# Patient Record
Sex: MALE | Race: WHITE | Employment: OTHER | URBAN - METROPOLITAN AREA
[De-identification: names, ages, dates, MRNs, and addresses within clinical notes are randomized per-mention and may not be internally consistent; named-entity substitution may affect disease eponyms.]

---

## 2019-03-28 ASSESSMENT — MIFFLIN-ST. JEOR: SCORE: 1409.6

## 2019-04-24 RX ORDER — SIMVASTATIN 20 MG
20 TABLET ORAL AT BEDTIME
COMMUNITY

## 2019-04-24 RX ORDER — CALCIUM CARBONATE/VITAMIN D3 500-10/5ML
400 LIQUID (ML) ORAL DAILY
COMMUNITY

## 2019-04-24 RX ORDER — LORATADINE 10 MG/1
10 TABLET ORAL DAILY
COMMUNITY

## 2019-04-24 RX ORDER — LISINOPRIL AND HYDROCHLOROTHIAZIDE 20; 25 MG/1; MG/1
1 TABLET ORAL DAILY
COMMUNITY

## 2019-04-24 RX ORDER — ALBUTEROL SULFATE 90 UG/1
1-2 AEROSOL, METERED RESPIRATORY (INHALATION) EVERY 4 HOURS PRN
COMMUNITY

## 2019-04-25 ENCOUNTER — HOSPITAL ENCOUNTER (INPATIENT)
Facility: CLINIC | Age: 59
LOS: 1 days | Discharge: HOME | DRG: 460 | End: 2019-04-26
Attending: ORTHOPAEDIC SURGERY | Admitting: ORTHOPAEDIC SURGERY
Payer: OTHER MISCELLANEOUS

## 2019-04-25 ENCOUNTER — ANESTHESIA EVENT (OUTPATIENT)
Dept: SURGERY | Facility: CLINIC | Age: 59
DRG: 460 | End: 2019-04-25
Payer: OTHER MISCELLANEOUS

## 2019-04-25 ENCOUNTER — APPOINTMENT (OUTPATIENT)
Dept: GENERAL RADIOLOGY | Facility: CLINIC | Age: 59
DRG: 460 | End: 2019-04-25
Attending: ORTHOPAEDIC SURGERY
Payer: OTHER MISCELLANEOUS

## 2019-04-25 ENCOUNTER — ANESTHESIA (OUTPATIENT)
Dept: SURGERY | Facility: CLINIC | Age: 59
DRG: 460 | End: 2019-04-25
Payer: OTHER MISCELLANEOUS

## 2019-04-25 DIAGNOSIS — M47.816 LUMBAR SPONDYLOSIS: Primary | ICD-10-CM

## 2019-04-25 LAB
ABO + RH BLD: NORMAL
ABO + RH BLD: NORMAL
BLD GP AB SCN SERPL QL: NORMAL
BLOOD BANK CMNT PATIENT-IMP: NORMAL
SPECIMEN EXP DATE BLD: NORMAL

## 2019-04-25 PROCEDURE — 25800030 ZZH RX IP 258 OP 636: Performed by: PHYSICIAN ASSISTANT

## 2019-04-25 PROCEDURE — 25800030 ZZH RX IP 258 OP 636: Performed by: ORTHOPAEDIC SURGERY

## 2019-04-25 PROCEDURE — C1762 CONN TISS, HUMAN(INC FASCIA): HCPCS | Performed by: ORTHOPAEDIC SURGERY

## 2019-04-25 PROCEDURE — 0SB40ZZ EXCISION OF LUMBOSACRAL DISC, OPEN APPROACH: ICD-10-PCS | Performed by: ORTHOPAEDIC SURGERY

## 2019-04-25 PROCEDURE — 0SG30A0 FUSION OF LUMBOSACRAL JOINT WITH INTERBODY FUSION DEVICE, ANTERIOR APPROACH, ANTERIOR COLUMN, OPEN APPROACH: ICD-10-PCS | Performed by: ORTHOPAEDIC SURGERY

## 2019-04-25 PROCEDURE — 27210995 ZZH RX 272: Performed by: ORTHOPAEDIC SURGERY

## 2019-04-25 PROCEDURE — 25000125 ZZHC RX 250: Performed by: NURSE ANESTHETIST, CERTIFIED REGISTERED

## 2019-04-25 PROCEDURE — 99207 ZZC CONSULT E&M CHANGED TO INITIAL LEVEL: CPT | Performed by: INTERNAL MEDICINE

## 2019-04-25 PROCEDURE — 25000125 ZZHC RX 250: Performed by: ORTHOPAEDIC SURGERY

## 2019-04-25 PROCEDURE — 71000012 ZZH RECOVERY PHASE 1 LEVEL 1 FIRST HR: Performed by: ORTHOPAEDIC SURGERY

## 2019-04-25 PROCEDURE — 86901 BLOOD TYPING SEROLOGIC RH(D): CPT | Performed by: ANESTHESIOLOGY

## 2019-04-25 PROCEDURE — 71000013 ZZH RECOVERY PHASE 1 LEVEL 1 EA ADDTL HR: Performed by: ORTHOPAEDIC SURGERY

## 2019-04-25 PROCEDURE — 27210794 ZZH OR GENERAL SUPPLY STERILE: Performed by: ORTHOPAEDIC SURGERY

## 2019-04-25 PROCEDURE — 25000128 H RX IP 250 OP 636: Performed by: NURSE ANESTHETIST, CERTIFIED REGISTERED

## 2019-04-25 PROCEDURE — 37000009 ZZH ANESTHESIA TECHNICAL FEE, EACH ADDTL 15 MIN: Performed by: ORTHOPAEDIC SURGERY

## 2019-04-25 PROCEDURE — 40000277 XR SURGERY CARM FLUORO LESS THAN 5 MIN W STILLS: Mod: TC

## 2019-04-25 PROCEDURE — 12000000 ZZH R&B MED SURG/OB

## 2019-04-25 PROCEDURE — 86850 RBC ANTIBODY SCREEN: CPT | Performed by: ANESTHESIOLOGY

## 2019-04-25 PROCEDURE — 25000128 H RX IP 250 OP 636: Performed by: ANESTHESIOLOGY

## 2019-04-25 PROCEDURE — 40000306 ZZH STATISTIC PRE PROC ASSESS II: Performed by: ORTHOPAEDIC SURGERY

## 2019-04-25 PROCEDURE — C1713 ANCHOR/SCREW BN/BN,TIS/BN: HCPCS | Performed by: ORTHOPAEDIC SURGERY

## 2019-04-25 PROCEDURE — 25800030 ZZH RX IP 258 OP 636: Performed by: NURSE ANESTHETIST, CERTIFIED REGISTERED

## 2019-04-25 PROCEDURE — 99222 1ST HOSP IP/OBS MODERATE 55: CPT | Performed by: INTERNAL MEDICINE

## 2019-04-25 PROCEDURE — 25000128 H RX IP 250 OP 636: Performed by: PHYSICIAN ASSISTANT

## 2019-04-25 PROCEDURE — 36000071 ZZH SURGERY LEVEL 5 W FLUORO 1ST 30 MIN: Performed by: ORTHOPAEDIC SURGERY

## 2019-04-25 PROCEDURE — 01NB0ZZ RELEASE LUMBAR NERVE, OPEN APPROACH: ICD-10-PCS | Performed by: ORTHOPAEDIC SURGERY

## 2019-04-25 PROCEDURE — 25800030 ZZH RX IP 258 OP 636: Performed by: ANESTHESIOLOGY

## 2019-04-25 PROCEDURE — 37000008 ZZH ANESTHESIA TECHNICAL FEE, 1ST 30 MIN: Performed by: ORTHOPAEDIC SURGERY

## 2019-04-25 PROCEDURE — 86900 BLOOD TYPING SEROLOGIC ABO: CPT | Performed by: ANESTHESIOLOGY

## 2019-04-25 PROCEDURE — 36000069 ZZH SURGERY LEVEL 5 EA 15 ADDTL MIN: Performed by: ORTHOPAEDIC SURGERY

## 2019-04-25 PROCEDURE — 25000132 ZZH RX MED GY IP 250 OP 250 PS 637: Performed by: PHYSICIAN ASSISTANT

## 2019-04-25 PROCEDURE — 36415 COLL VENOUS BLD VENIPUNCTURE: CPT | Performed by: ANESTHESIOLOGY

## 2019-04-25 DEVICE — GRAFT BONE CRUSH CANC 15ML 400075: Type: IMPLANTABLE DEVICE | Site: SPINE LUMBAR | Status: FUNCTIONAL

## 2019-04-25 DEVICE — IMPLANTABLE DEVICE: Type: IMPLANTABLE DEVICE | Site: SPINE LUMBAR | Status: FUNCTIONAL

## 2019-04-25 RX ORDER — OXYCODONE HYDROCHLORIDE 5 MG/1
5-10 TABLET ORAL
Status: DISCONTINUED | OUTPATIENT
Start: 2019-04-25 | End: 2019-04-26 | Stop reason: HOSPADM

## 2019-04-25 RX ORDER — SODIUM CHLORIDE, SODIUM LACTATE, POTASSIUM CHLORIDE, CALCIUM CHLORIDE 600; 310; 30; 20 MG/100ML; MG/100ML; MG/100ML; MG/100ML
INJECTION, SOLUTION INTRAVENOUS CONTINUOUS
Status: DISCONTINUED | OUTPATIENT
Start: 2019-04-25 | End: 2019-04-25 | Stop reason: HOSPADM

## 2019-04-25 RX ORDER — MEPERIDINE HYDROCHLORIDE 25 MG/ML
12.5 INJECTION INTRAMUSCULAR; INTRAVENOUS; SUBCUTANEOUS
Status: DISCONTINUED | OUTPATIENT
Start: 2019-04-25 | End: 2019-04-25 | Stop reason: HOSPADM

## 2019-04-25 RX ORDER — MAGNESIUM OXIDE 400 MG/1
400 TABLET ORAL DAILY
Status: DISCONTINUED | OUTPATIENT
Start: 2019-04-25 | End: 2019-04-26 | Stop reason: HOSPADM

## 2019-04-25 RX ORDER — HYDRALAZINE HYDROCHLORIDE 20 MG/ML
2.5-5 INJECTION INTRAMUSCULAR; INTRAVENOUS EVERY 10 MIN PRN
Status: DISCONTINUED | OUTPATIENT
Start: 2019-04-25 | End: 2019-04-25 | Stop reason: HOSPADM

## 2019-04-25 RX ORDER — CEFAZOLIN SODIUM 2 G/100ML
2 INJECTION, SOLUTION INTRAVENOUS
Status: COMPLETED | OUTPATIENT
Start: 2019-04-25 | End: 2019-04-25

## 2019-04-25 RX ORDER — HYDROMORPHONE HYDROCHLORIDE 1 MG/ML
.3-.5 INJECTION, SOLUTION INTRAMUSCULAR; INTRAVENOUS; SUBCUTANEOUS
Status: DISCONTINUED | OUTPATIENT
Start: 2019-04-25 | End: 2019-04-26 | Stop reason: HOSPADM

## 2019-04-25 RX ORDER — GLYCOPYRROLATE 0.2 MG/ML
INJECTION, SOLUTION INTRAMUSCULAR; INTRAVENOUS PRN
Status: DISCONTINUED | OUTPATIENT
Start: 2019-04-25 | End: 2019-04-25

## 2019-04-25 RX ORDER — ONDANSETRON 2 MG/ML
INJECTION INTRAMUSCULAR; INTRAVENOUS PRN
Status: DISCONTINUED | OUTPATIENT
Start: 2019-04-25 | End: 2019-04-25

## 2019-04-25 RX ORDER — LIDOCAINE 40 MG/G
CREAM TOPICAL
Status: DISCONTINUED | OUTPATIENT
Start: 2019-04-25 | End: 2019-04-25 | Stop reason: HOSPADM

## 2019-04-25 RX ORDER — LISINOPRIL AND HYDROCHLOROTHIAZIDE 20; 25 MG/1; MG/1
1 TABLET ORAL DAILY
Status: DISCONTINUED | OUTPATIENT
Start: 2019-04-25 | End: 2019-04-26 | Stop reason: HOSPADM

## 2019-04-25 RX ORDER — ONDANSETRON 2 MG/ML
4 INJECTION INTRAMUSCULAR; INTRAVENOUS EVERY 30 MIN PRN
Status: DISCONTINUED | OUTPATIENT
Start: 2019-04-25 | End: 2019-04-25 | Stop reason: HOSPADM

## 2019-04-25 RX ORDER — PANTOPRAZOLE SODIUM 40 MG/1
40 TABLET, DELAYED RELEASE ORAL
Status: DISCONTINUED | OUTPATIENT
Start: 2019-04-26 | End: 2019-04-26 | Stop reason: HOSPADM

## 2019-04-25 RX ORDER — NALOXONE HYDROCHLORIDE 0.4 MG/ML
.1-.4 INJECTION, SOLUTION INTRAMUSCULAR; INTRAVENOUS; SUBCUTANEOUS
Status: DISCONTINUED | OUTPATIENT
Start: 2019-04-25 | End: 2019-04-25 | Stop reason: HOSPADM

## 2019-04-25 RX ORDER — PROPOFOL 10 MG/ML
INJECTION, EMULSION INTRAVENOUS PRN
Status: DISCONTINUED | OUTPATIENT
Start: 2019-04-25 | End: 2019-04-25

## 2019-04-25 RX ORDER — PROPOFOL 10 MG/ML
INJECTION, EMULSION INTRAVENOUS CONTINUOUS PRN
Status: DISCONTINUED | OUTPATIENT
Start: 2019-04-25 | End: 2019-04-25

## 2019-04-25 RX ORDER — LIDOCAINE 40 MG/G
CREAM TOPICAL
Status: DISCONTINUED | OUTPATIENT
Start: 2019-04-25 | End: 2019-04-26 | Stop reason: HOSPADM

## 2019-04-25 RX ORDER — AMOXICILLIN 250 MG
1 CAPSULE ORAL 2 TIMES DAILY
Status: DISCONTINUED | OUTPATIENT
Start: 2019-04-25 | End: 2019-04-26 | Stop reason: HOSPADM

## 2019-04-25 RX ORDER — DEXAMETHASONE SODIUM PHOSPHATE 4 MG/ML
4 INJECTION, SOLUTION INTRA-ARTICULAR; INTRALESIONAL; INTRAMUSCULAR; INTRAVENOUS; SOFT TISSUE EVERY 10 MIN PRN
Status: DISCONTINUED | OUTPATIENT
Start: 2019-04-25 | End: 2019-04-25 | Stop reason: HOSPADM

## 2019-04-25 RX ORDER — ACETAMINOPHEN 325 MG/1
650 TABLET ORAL EVERY 4 HOURS PRN
Status: DISCONTINUED | OUTPATIENT
Start: 2019-04-28 | End: 2019-04-26 | Stop reason: HOSPADM

## 2019-04-25 RX ORDER — METOCLOPRAMIDE HYDROCHLORIDE 5 MG/ML
10 INJECTION INTRAMUSCULAR; INTRAVENOUS EVERY 6 HOURS PRN
Status: DISCONTINUED | OUTPATIENT
Start: 2019-04-25 | End: 2019-04-25 | Stop reason: HOSPADM

## 2019-04-25 RX ORDER — METOCLOPRAMIDE 10 MG/1
10 TABLET ORAL EVERY 6 HOURS PRN
Status: DISCONTINUED | OUTPATIENT
Start: 2019-04-25 | End: 2019-04-25 | Stop reason: HOSPADM

## 2019-04-25 RX ORDER — FENTANYL CITRATE 50 UG/ML
INJECTION, SOLUTION INTRAMUSCULAR; INTRAVENOUS PRN
Status: DISCONTINUED | OUTPATIENT
Start: 2019-04-25 | End: 2019-04-25

## 2019-04-25 RX ORDER — SIMVASTATIN 20 MG
20 TABLET ORAL AT BEDTIME
Status: DISCONTINUED | OUTPATIENT
Start: 2019-04-25 | End: 2019-04-26 | Stop reason: HOSPADM

## 2019-04-25 RX ORDER — KETOROLAC TROMETHAMINE 30 MG/ML
30 INJECTION, SOLUTION INTRAMUSCULAR; INTRAVENOUS EVERY 6 HOURS PRN
Status: DISCONTINUED | OUTPATIENT
Start: 2019-04-25 | End: 2019-04-25 | Stop reason: HOSPADM

## 2019-04-25 RX ORDER — NALOXONE HYDROCHLORIDE 0.4 MG/ML
.1-.4 INJECTION, SOLUTION INTRAMUSCULAR; INTRAVENOUS; SUBCUTANEOUS
Status: DISCONTINUED | OUTPATIENT
Start: 2019-04-25 | End: 2019-04-26 | Stop reason: HOSPADM

## 2019-04-25 RX ORDER — LORATADINE 10 MG/1
10 TABLET ORAL DAILY
Status: DISCONTINUED | OUTPATIENT
Start: 2019-04-26 | End: 2019-04-26 | Stop reason: HOSPADM

## 2019-04-25 RX ORDER — CEFAZOLIN SODIUM 1 G/3ML
1 INJECTION, POWDER, FOR SOLUTION INTRAMUSCULAR; INTRAVENOUS SEE ADMIN INSTRUCTIONS
Status: DISCONTINUED | OUTPATIENT
Start: 2019-04-25 | End: 2019-04-25 | Stop reason: HOSPADM

## 2019-04-25 RX ORDER — LIDOCAINE HYDROCHLORIDE 10 MG/ML
INJECTION, SOLUTION INFILTRATION; PERINEURAL PRN
Status: DISCONTINUED | OUTPATIENT
Start: 2019-04-25 | End: 2019-04-25

## 2019-04-25 RX ORDER — FENTANYL CITRATE 50 UG/ML
25-50 INJECTION, SOLUTION INTRAMUSCULAR; INTRAVENOUS
Status: DISCONTINUED | OUTPATIENT
Start: 2019-04-25 | End: 2019-04-25 | Stop reason: HOSPADM

## 2019-04-25 RX ORDER — LABETALOL 20 MG/4 ML (5 MG/ML) INTRAVENOUS SYRINGE
10
Status: COMPLETED | OUTPATIENT
Start: 2019-04-25 | End: 2019-04-25

## 2019-04-25 RX ORDER — ALBUTEROL SULFATE 90 UG/1
1-2 AEROSOL, METERED RESPIRATORY (INHALATION) EVERY 4 HOURS PRN
Status: DISCONTINUED | OUTPATIENT
Start: 2019-04-25 | End: 2019-04-26 | Stop reason: HOSPADM

## 2019-04-25 RX ORDER — ONDANSETRON 4 MG/1
4 TABLET, ORALLY DISINTEGRATING ORAL EVERY 6 HOURS PRN
Status: DISCONTINUED | OUTPATIENT
Start: 2019-04-25 | End: 2019-04-26 | Stop reason: HOSPADM

## 2019-04-25 RX ORDER — ONDANSETRON 2 MG/ML
4 INJECTION INTRAMUSCULAR; INTRAVENOUS EVERY 6 HOURS PRN
Status: DISCONTINUED | OUTPATIENT
Start: 2019-04-25 | End: 2019-04-26 | Stop reason: HOSPADM

## 2019-04-25 RX ORDER — ONDANSETRON 4 MG/1
4 TABLET, ORALLY DISINTEGRATING ORAL EVERY 30 MIN PRN
Status: DISCONTINUED | OUTPATIENT
Start: 2019-04-25 | End: 2019-04-25 | Stop reason: HOSPADM

## 2019-04-25 RX ORDER — DIPHENHYDRAMINE HYDROCHLORIDE 50 MG/ML
25 INJECTION INTRAMUSCULAR; INTRAVENOUS EVERY 6 HOURS PRN
Status: DISCONTINUED | OUTPATIENT
Start: 2019-04-25 | End: 2019-04-26 | Stop reason: HOSPADM

## 2019-04-25 RX ORDER — AMOXICILLIN 250 MG
2 CAPSULE ORAL 2 TIMES DAILY
Status: DISCONTINUED | OUTPATIENT
Start: 2019-04-25 | End: 2019-04-26 | Stop reason: HOSPADM

## 2019-04-25 RX ORDER — DIAZEPAM 5 MG
5 TABLET ORAL EVERY 6 HOURS PRN
Status: DISCONTINUED | OUTPATIENT
Start: 2019-04-25 | End: 2019-04-26 | Stop reason: HOSPADM

## 2019-04-25 RX ORDER — DEXAMETHASONE SODIUM PHOSPHATE 4 MG/ML
INJECTION, SOLUTION INTRA-ARTICULAR; INTRALESIONAL; INTRAMUSCULAR; INTRAVENOUS; SOFT TISSUE PRN
Status: DISCONTINUED | OUTPATIENT
Start: 2019-04-25 | End: 2019-04-25

## 2019-04-25 RX ORDER — DIMENHYDRINATE 50 MG/ML
25 INJECTION, SOLUTION INTRAMUSCULAR; INTRAVENOUS
Status: DISCONTINUED | OUTPATIENT
Start: 2019-04-25 | End: 2019-04-25 | Stop reason: HOSPADM

## 2019-04-25 RX ORDER — ACETAMINOPHEN 325 MG/1
975 TABLET ORAL EVERY 8 HOURS
Status: DISCONTINUED | OUTPATIENT
Start: 2019-04-25 | End: 2019-04-26 | Stop reason: HOSPADM

## 2019-04-25 RX ORDER — SODIUM CHLORIDE 9 MG/ML
INJECTION, SOLUTION INTRAVENOUS CONTINUOUS
Status: DISCONTINUED | OUTPATIENT
Start: 2019-04-25 | End: 2019-04-26 | Stop reason: HOSPADM

## 2019-04-25 RX ORDER — CEFAZOLIN SODIUM 1 G/50ML
1 INJECTION, SOLUTION INTRAVENOUS EVERY 8 HOURS
Status: COMPLETED | OUTPATIENT
Start: 2019-04-25 | End: 2019-04-26

## 2019-04-25 RX ORDER — AMLODIPINE BESYLATE 5 MG/1
5 TABLET ORAL DAILY
Status: DISCONTINUED | OUTPATIENT
Start: 2019-04-25 | End: 2019-04-26 | Stop reason: HOSPADM

## 2019-04-25 RX ORDER — ACETAMINOPHEN 325 MG/1
650 TABLET ORAL 4 TIMES DAILY PRN
Status: DISCONTINUED | OUTPATIENT
Start: 2019-04-25 | End: 2019-04-25 | Stop reason: ALTCHOICE

## 2019-04-25 RX ORDER — DIPHENHYDRAMINE HCL 25 MG
25 CAPSULE ORAL EVERY 6 HOURS PRN
Status: DISCONTINUED | OUTPATIENT
Start: 2019-04-25 | End: 2019-04-26 | Stop reason: HOSPADM

## 2019-04-25 RX ADMIN — PHENYLEPHRINE HYDROCHLORIDE 50 MCG: 10 INJECTION, SOLUTION INTRAMUSCULAR; INTRAVENOUS; SUBCUTANEOUS at 11:38

## 2019-04-25 RX ADMIN — MAGNESIUM OXIDE TAB 400 MG (241.3 MG ELEMENTAL MG) 400 MG: 400 (241.3 MG) TAB at 16:59

## 2019-04-25 RX ADMIN — OXYCODONE HYDROCHLORIDE 10 MG: 5 TABLET ORAL at 23:19

## 2019-04-25 RX ADMIN — CEFAZOLIN SODIUM 1 G: 1 INJECTION, SOLUTION INTRAVENOUS at 20:11

## 2019-04-25 RX ADMIN — LISINOPRIL AND HYDROCHLOROTHIAZIDE 1 TABLET: 25; 20 TABLET ORAL at 16:59

## 2019-04-25 RX ADMIN — Medication 0.5 MG: at 13:17

## 2019-04-25 RX ADMIN — FENTANYL CITRATE 50 MCG: 50 INJECTION, SOLUTION INTRAMUSCULAR; INTRAVENOUS at 13:08

## 2019-04-25 RX ADMIN — LIDOCAINE HYDROCHLORIDE 50 MG: 10 INJECTION, SOLUTION INFILTRATION; PERINEURAL at 10:59

## 2019-04-25 RX ADMIN — SODIUM CHLORIDE, POTASSIUM CHLORIDE, SODIUM LACTATE AND CALCIUM CHLORIDE: 600; 310; 30; 20 INJECTION, SOLUTION INTRAVENOUS at 10:15

## 2019-04-25 RX ADMIN — SODIUM CHLORIDE: 9 INJECTION, SOLUTION INTRAVENOUS at 20:14

## 2019-04-25 RX ADMIN — SODIUM CHLORIDE, POTASSIUM CHLORIDE, SODIUM LACTATE AND CALCIUM CHLORIDE: 600; 310; 30; 20 INJECTION, SOLUTION INTRAVENOUS at 11:00

## 2019-04-25 RX ADMIN — SENNOSIDES AND DOCUSATE SODIUM 1 TABLET: 8.6; 5 TABLET ORAL at 20:11

## 2019-04-25 RX ADMIN — MIDAZOLAM 2 MG: 1 INJECTION INTRAMUSCULAR; INTRAVENOUS at 10:56

## 2019-04-25 RX ADMIN — AMLODIPINE BESYLATE 5 MG: 5 TABLET ORAL at 16:59

## 2019-04-25 RX ADMIN — DEXAMETHASONE SODIUM PHOSPHATE 8 MG: 4 INJECTION, SOLUTION INTRA-ARTICULAR; INTRALESIONAL; INTRAMUSCULAR; INTRAVENOUS; SOFT TISSUE at 10:59

## 2019-04-25 RX ADMIN — GLYCOPYRROLATE 0.2 MG: 0.2 INJECTION, SOLUTION INTRAMUSCULAR; INTRAVENOUS at 10:59

## 2019-04-25 RX ADMIN — HYDROMORPHONE HYDROCHLORIDE 1 MG: 1 INJECTION, SOLUTION INTRAMUSCULAR; INTRAVENOUS; SUBCUTANEOUS at 11:15

## 2019-04-25 RX ADMIN — OXYCODONE HYDROCHLORIDE 10 MG: 5 TABLET ORAL at 20:11

## 2019-04-25 RX ADMIN — PHENYLEPHRINE HYDROCHLORIDE 50 MCG: 10 INJECTION, SOLUTION INTRAMUSCULAR; INTRAVENOUS; SUBCUTANEOUS at 11:50

## 2019-04-25 RX ADMIN — ACETAMINOPHEN 975 MG: 325 TABLET, FILM COATED ORAL at 23:19

## 2019-04-25 RX ADMIN — ROCURONIUM BROMIDE 50 MG: 10 INJECTION INTRAVENOUS at 10:59

## 2019-04-25 RX ADMIN — ACETAMINOPHEN 975 MG: 325 TABLET, FILM COATED ORAL at 16:59

## 2019-04-25 RX ADMIN — FENTANYL CITRATE 50 MCG: 50 INJECTION, SOLUTION INTRAMUSCULAR; INTRAVENOUS at 13:37

## 2019-04-25 RX ADMIN — LABETALOL 20 MG/4 ML (5 MG/ML) INTRAVENOUS SYRINGE 10 MG: at 13:33

## 2019-04-25 RX ADMIN — CEFAZOLIN SODIUM 2 G: 2 INJECTION, SOLUTION INTRAVENOUS at 11:05

## 2019-04-25 RX ADMIN — SIMVASTATIN 20 MG: 20 TABLET, FILM COATED ORAL at 23:19

## 2019-04-25 RX ADMIN — FENTANYL CITRATE 150 MCG: 50 INJECTION, SOLUTION INTRAMUSCULAR; INTRAVENOUS at 10:59

## 2019-04-25 RX ADMIN — PROPOFOL 200 MG: 10 INJECTION, EMULSION INTRAVENOUS at 10:59

## 2019-04-25 RX ADMIN — ONDANSETRON 4 MG: 2 INJECTION INTRAMUSCULAR; INTRAVENOUS at 12:10

## 2019-04-25 RX ADMIN — OXYCODONE HYDROCHLORIDE 5 MG: 5 TABLET ORAL at 16:59

## 2019-04-25 RX ADMIN — FENTANYL CITRATE 100 MCG: 50 INJECTION, SOLUTION INTRAMUSCULAR; INTRAVENOUS at 11:17

## 2019-04-25 RX ADMIN — PROPOFOL 100 MG: 10 INJECTION, EMULSION INTRAVENOUS at 12:22

## 2019-04-25 RX ADMIN — FENTANYL CITRATE 50 MCG: 50 INJECTION, SOLUTION INTRAMUSCULAR; INTRAVENOUS at 13:14

## 2019-04-25 RX ADMIN — PROPOFOL 50 MCG/KG/MIN: 10 INJECTION, EMULSION INTRAVENOUS at 10:59

## 2019-04-25 RX ADMIN — FENTANYL CITRATE 50 MCG: 50 INJECTION, SOLUTION INTRAMUSCULAR; INTRAVENOUS at 14:08

## 2019-04-25 RX ADMIN — PHENYLEPHRINE HYDROCHLORIDE 100 MCG: 10 INJECTION, SOLUTION INTRAMUSCULAR; INTRAVENOUS; SUBCUTANEOUS at 11:32

## 2019-04-25 ASSESSMENT — ACTIVITIES OF DAILY LIVING (ADL)
ADLS_ACUITY_SCORE: 13
ADLS_ACUITY_SCORE: 10

## 2019-04-25 ASSESSMENT — MIFFLIN-ST. JEOR: SCORE: 1405.06

## 2019-04-25 NOTE — ANESTHESIA POSTPROCEDURE EVALUATION
Patient: Damaso English    Procedure(s):  L5-S1 anterior lumbar interbody fusion    Diagnosis:Back pain  Diagnosis Additional Information: Diagnosis: Back pain    Anesthesia Type:  General, ETT    Note:  Anesthesia Post Evaluation    Patient location during evaluation: PACU  Patient participation: Able to fully participate in evaluation  Level of consciousness: awake and alert  Pain management: adequate  Airway patency: patent  Cardiovascular status: acceptable  Respiratory status: acceptable  Hydration status: acceptable  PONV: controlled     Anesthetic complications: None          Last vitals:  Vitals:    04/25/19 1445 04/25/19 1500 04/25/19 1553   BP:  157/83 154/79   Pulse:  70    Resp: 26 17 12   Temp:   98.6  F (37  C)   SpO2: 91% 97% 96%         Electronically Signed By: Moisés Ceron MD  April 25, 2019  4:25 PM

## 2019-04-25 NOTE — ANESTHESIA CARE TRANSFER NOTE
Patient: Damaso English    Procedure(s):  L5-S1 anterior lumbar interbody fusion    Diagnosis: Back pain  Diagnosis Additional Information: No value filed.    Anesthesia Type:   General, ETT     Note:  Airway :Face Mask  Patient transferred to:PACU  Comments:   4/4, moving all extremities, pt follows commands, suctioned orally, extubated without complications.Pt tx to PACU, VSS, pt comfortable. Report given to  PACU RN.Handoff Report: Identifed the Patient, Identified the Reponsible Provider, Reviewed the pertinent medical history, Discussed the surgical course, Reviewed Intra-OP anesthesia mangement and issues during anesthesia, Set expectations for post-procedure period and Allowed opportunity for questions and acknowledgement of understanding      Vitals: (Last set prior to Anesthesia Care Transfer)    CRNA VITALS  4/25/2019 1158 - 4/25/2019 1234      4/25/2019             NIBP:  163/85    Pulse:  95    NIBP Mean:  107    SpO2:  99 %    Resp Rate (observed):  13                Electronically Signed By: JOSE LUIS Armstrong CRNA  April 25, 2019  12:34 PM

## 2019-04-25 NOTE — PHARMACY-ADMISSION MEDICATION HISTORY
Admission medication history interview status for this patient is complete. See [facility] admission navigator for allergy information, prior to admission medications and immunization status.     PTA meds completed by pre-admitting nurse Xochitl Carbajal and reviewed by pharmacy       Prior to Admission medications    Medication Sig Last Dose Taking? Auth Provider   ACETAMINOPHEN ER PO Take 650 mg by mouth 4 times daily as needed 4/23/2019 Yes Reported, Patient   albuterol (PROAIR HFA/PROVENTIL HFA/VENTOLIN HFA) 108 (90 Base) MCG/ACT inhaler Inhale 1-2 puffs into the lungs every 4 hours as needed for shortness of breath / dyspnea or wheezing 4/25/2019 at Unknown time Yes Reported, Patient   AMLODIPINE BESYLATE PO Take 5 mg by mouth daily 4/24/2019 at Unknown time Yes Reported, Patient   lisinopril-hydrochlorothiazide (PRINZIDE/ZESTORETIC) 20-25 MG tablet Take 1 tablet by mouth daily 4/24/2019 at Unknown time Yes Reported, Patient   loratadine (CLARITIN) 10 MG tablet Take 10 mg by mouth daily 4/25/2019 at Unknown time Yes Reported, Patient   magnesium oxide 400 MG CAPS Take 400 mg by mouth daily 4/24/2019 at Unknown time Yes Reported, Patient   melatonin 5 MG tablet Take 5 mg by mouth nightly as needed for sleep 4/23/2019 Yes Reported, Patient   PANTOPRAZOLE SODIUM PO Take 40 mg by mouth every morning (before breakfast) 4/24/2019 at Unknown time Yes Reported, Patient   simvastatin (ZOCOR) 20 MG tablet Take 20 mg by mouth At Bedtime 4/24/2019 at Unknown time Yes Reported, Patient   tetrahydrozoline-Zn sulfate (VISINE-AC) 0.05-0.25 % ophthalmic solution Apply 1 drop to eye as needed 4/24/2019 at Unknown time Yes Reported, Patient

## 2019-04-25 NOTE — OP NOTE
Procedure Date: 04/25/2019      PREOPERATIVE DIAGNOSIS:  L5-S1 post-laminectomy syndrome with low back and radiculopathy.      POSTOPERATIVE DIAGNOSIS:  L5-S1 post-laminectomy syndrome with low back and radiculopathy.      PROCEDURE:  L5-S1 anterior lumbar interbody fusion and anterior arthrodesis L5-S1, anterior plating L5-S1, placement of PEEK intervertebral mechanical spacer, L5-S1 use of local vertebral bone, cancellous allograft bone and i-FACTOR bone protein.      SURGEON:  Teodoro Orr MD      ASSISTANT:  Akin Mcgraw PA-C      ANESTHESIA:  General.      ESTIMATED BLOOD LOSS:  25 mL.      INDICATIONS:  The patient for treatment of post-laminectomy syndrome.  Risks and benefits discussed, heart attack, stroke, blood clot, wound infection, chronic back pain, chronic leg pain, permanent pain, permanent weakness, permanent paraplegia, permanent bowel dysfunction, chronic groin pain, chronic weakness, need for revision surgery, vascular bowel or urologic injury.  Informed consent was obtained.      This is a staged procedure.  I discussed preoperatively with the patient that given the complexity of reconstruction with previous posterior surgeries, I want to do an anterior column reconstruction and stabilization and assess his pain control, both incisional and neurologic, before proceeding with staged planned posterior decompression and fusion.      PROCEDURE:  The patient was brought to the operating room, intubated and placed supine on the operating table.  All extremities were padded and secured.  Prepped and draped the lower abdomen sterilely.  A timeout was performed.        We exposed the lumbar spine retroperitoneally to L5-S1.  We placed a marker in the disk space after mobilizing the vessels working within the bifurcation of the vascular tree.  X-ray confirmed appropriate level.  I then proceeded to do a near-complete diskectomy with pituitaries, curettes and Martínez, to remove the disk, and then I  resected the posterior longitudinal ligament for a balanced even distraction anteriorly and posteriorly.  I then trialed, impacted and placed a 14 mm high 8-degree lordotic PEEK intervertebral mechanical spacer filled with local vertebral bone, cancellous allograft bone and i-FACTOR bone protein.  I then placed an anterior plate and placed 25 mm variable screws in L5 and S1 with good insertional torque.  I secured the locking mechanism.        Final AP and lateral images confirmed good hardware and cage position.  The wound was irrigated, hemostasis achieved, the wound closed in layers.  Sterile dressing applied.  The patient was extubated and brought to the recovery room, moving all 4 extremities.  Akin Mcgraw, first assistant, was necessary for patient safety.  He helped with soft tissue management, retracting soft tissue and vascular structures, and helped with a clear pathway for diskectomy, instrumentation placement and wound closure and management.         JAMEL BARNES MD             D: 2019   T: 2019   MT: KAREN      Name:     JODY DAY   MRN:      [id number]        Account:        [id number]   :      1960           Procedure Date: 2019      Document: [id number]

## 2019-04-25 NOTE — ANESTHESIA PREPROCEDURE EVALUATION
Anesthesia Pre-Procedure Evaluation    Patient: Damaso English   MRN: [id number] : 1960          Preoperative Diagnosis: Back pain    Procedure(s):  L4-S1 revision laminectomy, L5-S1 anterior lumbar interbody fusion    Past Medical History:   Diagnosis Date     Arthritis     knee     Gastroesophageal reflux disease      High cholesterol      Hypertension      Other chronic pain     low back     Uncomplicated asthma      Past Surgical History:   Procedure Laterality Date     BIOPSY Left     ? lipoma on chestremoved     LAMINECT/DISCECTOMY, LUMBAR  2018     ORTHOPEDIC SURGERY Right     patella tightening     ORTHOPEDIC SURGERY Right     elbow I&D Cellulitis     ORTHOPEDIC SURGERY Left     pinky laceration repaired     Anesthesia Evaluation     .             ROS/MED HX    ENT/Pulmonary:     (+)Intermittent asthma Treatment: Inhaler prn,  , . .    Neurologic:  - neg neurologic ROS     Cardiovascular:     (+) Dyslipidemia, hypertension----. : . . . :. .       METS/Exercise Tolerance:     Hematologic:         Musculoskeletal:   (+)  other musculoskeletal- low back pain      GI/Hepatic:     (+) GERD Asymptomatic on medication,       Renal/Genitourinary:         Endo:  - neg endo ROS       Psychiatric:  - neg psychiatric ROS       Infectious Disease:  - neg infectious disease ROS       Malignancy:      - no malignancy   Other:    (+) No chance of pregnancy C-spine cleared: N/A, H/O Chronic Pain,no other significant disability   - neg other ROS                      Physical Exam  Normal systems: cardiovascular, pulmonary and dental    Airway   Mallampati: II  TM distance: >3 FB  Neck ROM: full    Dental     Cardiovascular       Pulmonary             No results found for: WBC, HGB, HCT, PLT, CRP, SED, NA, POTASSIUM, CHLORIDE, CO2, BUN, CR, GLC, CHARLI, PHOS, MAG, ALBUMIN, PROTTOTAL, ALT, AST, GGT, ALKPHOS, BILITOTAL, BILIDIRECT, LIPASE, AMYLASE, BIBI, PTT, INR, FIBR, TSH, T4, T3, HCG, HCGS, CKTOTAL, CKMB,  "TROPN    Preop Vitals  BP Readings from Last 3 Encounters:   04/25/19 174/90    Pulse Readings from Last 3 Encounters:   04/25/19 88      Resp Readings from Last 3 Encounters:   04/25/19 18    SpO2 Readings from Last 3 Encounters:   04/25/19 98%      Temp Readings from Last 1 Encounters:   04/25/19 97.7  F (36.5  C) (Temporal)    Ht Readings from Last 1 Encounters:   04/25/19 1.651 m (5' 5\")      Wt Readings from Last 1 Encounters:   04/25/19 65.3 kg (144 lb)    Estimated body mass index is 23.96 kg/m  as calculated from the following:    Height as of this encounter: 1.651 m (5' 5\").    Weight as of this encounter: 65.3 kg (144 lb).       Anesthesia Plan      History & Physical Review  History and physical reviewed and following examination; no interval change.    ASA Status:  3 .    NPO Status:  > 8 hours    Plan for General and ETT with Intravenous induction. Maintenance will be Balanced.    PONV prophylaxis:  Ondansetron (or other 5HT-3) and Dexamethasone or Solumedrol       Postoperative Care  Postoperative pain management:  IV analgesics.      Consents  Anesthetic plan, risks, benefits and alternatives discussed with:  Patient.  Use of blood products discussed: Yes.   Use of blood products discussed with Patient.  Consented to blood products.  .                 Moissé Ceron MD                    .  "

## 2019-04-25 NOTE — BRIEF OP NOTE
[facility] Brief Operative Note    Pre-operative diagnosis: Back pain   Post-operative diagnosis L5/S1 post laminectomy syndrome   Procedure: Procedure(s):  L5-S1 anterior lumbar interbody fusion   Surgeon(s): Surgeon(s) and Role:     * Teodoro Orr MD - Primary     * Akin Mcgraw PA-C - Assisting   Estimated blood loss: * No values recorded between 4/25/2019 11:16 AM and 4/25/2019 12:28 PM *    Specimens: * No specimens in log *   Findings: See op note

## 2019-04-25 NOTE — OP NOTE
[facility]     Operative Note    Pre-operative diagnosis: Back pain   Post-operative diagnosis Same*   Procedure: Procedure(s):  L5-S1 anterior lumbar interbody fusion   Surgeon: Asael Hutton MD   Co-Surgeon Teodoro Orr MD   Anesthesia: General    Estimated blood loss: 25 ml   Total IV fluids: (See anesthesia record)   Blood transfusion: (See anesthesia record)   Total urine output: (See anesthesia record)   Drains: None   Specimens: * No specimens in log *   Findings: L5-S1 succeessfully exposed   Complications: None   Procedure: After informed consent was obtained from the patient, hewas brought to the operating suite and placed on the operating table.  General anesthesia was introduced and his abdomen was prepped and draped in usual sterile manner.  A low transverse incision was made.  We retracted the left rectus muscle laterally and swept the peritoneal contents medially.  The Bookwalter retractor was placed and L5-S1 identified by palpation.  It was cleared by  Blunt dissection and judicious use of cautery The vessels were retracted with a Arnulfo retractor on either side.  We placed a spinal needle in the disk space and obtained a lateral x-ray to assure ourselves this was indeedL5-S1.   proceeded with diskectomy, disk site preparation, and placement of implant.  This will all be dictated separately. During this process we retracted the vessels with a handheld [place] retractor(s).  Care was take not to occlude or kink the vessels.  Retraction was released a frequent intervals.   Following this, we assured hemostasis.  We used FloSeal and thrombin-soaked Gelfoam patties. We closed with looped #1 PDS, 2-0 Vicryl and 4-o monocryl.  Steri-Strip and  dressings were applied.

## 2019-04-25 NOTE — H&P
[facility]    Hospitalist Consultation    Date of Admission:  4/25/2019  Date of Consult (When I saw the patient): 04/25/19    Assessment & Plan   Damaso English is a 58 year old male who was admitted on 4/25/2019. I was asked to see the patient for medical management of hypertension and asthma.    Summary:  Back pain with postlaminectomy syndrome and radiculopathy:    Postoperative care per orthopedic surgery    Pain management and DVT prophylaxis per orthopedic surgery    Hypertension:    Agree with resuming prior to admission antihypertensives, amlodipine and Prinzide    Will follow blood pressures    Asthma: Mild and controlled    Resume PRN albuterol    Continue incentive spirometry    Resume Claritin    Hyperlipidemia:    Resume statin, Zocor    History of alcohol abuse    No alcohol since January 2019    GERD:    Continue Protonix      DVT Prophylaxis: Defer to primary service  Code Status: Full Code    Disposition Plan   Expected discharge in 1 days to prior living arrangement once okay with orthopedic surgery.     Entered: Fidel Cintron 04/25/2019, 4:52 PM         Fidel Cintron MD    Reason for Consult   Reason for consult: I was asked by Dr. Orr to evaluate this patient for medical management hypertension and asthma.    Primary Care Physician   Radha Khan    Chief Complaint   Patient is a 58-year-old man with chronic low back pain and foot drop who has had an L5-S1 fusion.    History is obtained from the patient and electronic health record    History of Present Illness   Damaso English is a 58 year old male with a history of chronic low back pain with radiculopathy and foot drop who now presents for fusion.  Patient has a history of hypertension and hyperlipidemia as well as mild asthma.  He also has a more distant history of C. difficile infection and iron deficiency anemia.  He states he has been using his albuterol inhaler more often recently with the change in the  weather and some seasonal allergies.  Currently he denies any shortness of breath or wheezing.  No chest pain, fevers, sweats, chills.  He states his pain is doing fairly well and rates it a 4 on a scale of 10.  He has a history of gastric ulcer and GERD.  No alcohol use since January of this year.    Past Medical History    I have reviewed this patient's medical history and updated it with pertinent information if needed.   Past Medical History:   Diagnosis Date     Arthritis     knee     Gastroesophageal reflux disease      High cholesterol      Hypertension      Other chronic pain     low back     Uncomplicated asthma        Past Surgical History   I have reviewed this patient's surgical history and updated it with pertinent information if needed.  Past Surgical History:   Procedure Laterality Date     BIOPSY Left     ? lipoma on chestremoved     LAMINECT/DISCECTOMY, LUMBAR  06/2018     ORTHOPEDIC SURGERY Right     patella tightening     ORTHOPEDIC SURGERY Right     elbow I&D Cellulitis     ORTHOPEDIC SURGERY Left     pinky laceration repaired       Prior to Admission Medications   Prior to Admission Medications   Prescriptions Last Dose Informant Patient Reported? Taking?   ACETAMINOPHEN ER PO 4/23/2019  Yes Yes   Sig: Take 650 mg by mouth 4 times daily as needed   AMLODIPINE BESYLATE PO 4/24/2019 at Unknown time  Yes Yes   Sig: Take 5 mg by mouth daily   PANTOPRAZOLE SODIUM PO 4/24/2019 at Unknown time  Yes Yes   Sig: Take 40 mg by mouth every morning (before breakfast)   albuterol (PROAIR HFA/PROVENTIL HFA/VENTOLIN HFA) 108 (90 Base) MCG/ACT inhaler 4/25/2019 at Unknown time  Yes Yes   Sig: Inhale 1-2 puffs into the lungs every 4 hours as needed for shortness of breath / dyspnea or wheezing   lisinopril-hydrochlorothiazide (PRINZIDE/ZESTORETIC) 20-25 MG tablet 4/24/2019 at Unknown time  Yes Yes   Sig: Take 1 tablet by mouth daily   loratadine (CLARITIN) 10 MG tablet 4/25/2019 at Unknown time  Yes Yes   Sig:  Take 10 mg by mouth daily   magnesium oxide 400 MG CAPS 4/24/2019 at Unknown time  Yes Yes   Sig: Take 400 mg by mouth daily   melatonin 5 MG tablet 4/23/2019  Yes Yes   Sig: Take 5 mg by mouth nightly as needed for sleep   simvastatin (ZOCOR) 20 MG tablet 4/24/2019 at Unknown time  Yes Yes   Sig: Take 20 mg by mouth At Bedtime   tetrahydrozoline-Zn sulfate (VISINE-AC) 0.05-0.25 % ophthalmic solution 4/24/2019 at Unknown time  Yes Yes   Sig: Apply 1 drop to eye as needed      Facility-Administered Medications: None     Allergies   Allergies   Allergen Reactions     Adhesive Tape      Tore skin     Codeine Hives       Social History   I have reviewed this patient's social history and updated it with pertinent information if needed. Damaso English  reports that he has never smoked. He has never used smokeless tobacco. He reports that he drinks alcohol. He reports that he does not use drugs.    Family History   I have reviewed this patient's family history and updated it with pertinent information if needed.   Family history is significant for diabetes, hypertension, breast cancer    Review of Systems   The 10 point Review of Systems is negative other than noted in the HPI or here.     Physical Exam   Temp: 98.6  F (37  C) Temp src: Oral BP: 159/70 Pulse: 70 Heart Rate: 69 Resp: 15 SpO2: 96 % O2 Device: None (Room air) Oxygen Delivery: 2 LPM  Vital Signs with Ranges  Temp:  [97.7  F (36.5  C)-99.2  F (37.3  C)] 98.6  F (37  C)  Pulse:  [67-88] 70  Heart Rate:  [65-92] 69  Resp:  [9-26] 15  BP: (138-174)/(70-95) 159/70  FiO2 (%):  [100 %] 100 %  SpO2:  [91 %-100 %] 96 %  144 lbs 0 oz    Constitutional: Awake, alert, cooperative, no apparent distress.  Wearing capnography  Eyes: Conjunctiva and pupils examined and normal.  HEENT: Moist mucous membranes  Respiratory: Clear to auscultation bilaterally, no crackles or wheezing.  Cardiovascular: Regular rate and rhythm, normal S1 and S2, and no murmur noted.  GI: Soft,  non-distended, non-tender, normal bowel sounds.  Skin: No rashes, no cyanosis, no edema.  Musculoskeletal: No joint swelling, erythema or tenderness.  Neurologic: Alert, oriented to person, place and time,  Psychiatric:  no obvious anxiety or depression.    Data   -Data reviewed today: All pertinent laboratory and imaging results from this encounter were reviewed. I personally reviewed no images or EKG's today.  No lab results found in last 7 days.    Imaging:  Recent Results (from the past 24 hour(s))   XR Surgery SANDRA L/T 5 Min Fluoro w Stills    Narrative    This exam was marked as non-reportable because it will not be read by a   radiologist or a [place] non-radiologist provider.

## 2019-04-26 ENCOUNTER — APPOINTMENT (OUTPATIENT)
Dept: PHYSICAL THERAPY | Facility: CLINIC | Age: 59
DRG: 460 | End: 2019-04-26
Attending: PHYSICIAN ASSISTANT
Payer: OTHER MISCELLANEOUS

## 2019-04-26 VITALS
SYSTOLIC BLOOD PRESSURE: 119 MMHG | HEART RATE: 70 BPM | RESPIRATION RATE: 14 BRPM | BODY MASS INDEX: 23.99 KG/M2 | HEIGHT: 65 IN | OXYGEN SATURATION: 99 % | TEMPERATURE: 96.2 F | DIASTOLIC BLOOD PRESSURE: 60 MMHG | WEIGHT: 144 LBS

## 2019-04-26 PROBLEM — I10 HTN (HYPERTENSION): Status: ACTIVE | Noted: 2019-04-26

## 2019-04-26 PROBLEM — K21.9 GASTROESOPHAGEAL REFLUX DISEASE WITHOUT ESOPHAGITIS: Status: ACTIVE | Noted: 2019-04-26

## 2019-04-26 LAB
ANION GAP SERPL CALCULATED.3IONS-SCNC: 5 MMOL/L (ref 3–14)
BUN SERPL-MCNC: 19 MG/DL (ref 7–30)
CALCIUM SERPL-MCNC: 8.2 MG/DL (ref 8.5–10.1)
CHLORIDE SERPL-SCNC: 101 MMOL/L (ref 94–109)
CO2 SERPL-SCNC: 26 MMOL/L (ref 20–32)
CREAT SERPL-MCNC: 0.99 MG/DL (ref 0.66–1.25)
GFR SERPL CREATININE-BSD FRML MDRD: 84 ML/MIN/{1.73_M2}
GLUCOSE BLDC GLUCOMTR-MCNC: 131 MG/DL (ref 70–99)
GLUCOSE SERPL-MCNC: 128 MG/DL (ref 70–99)
HGB BLD-MCNC: 9.4 G/DL (ref 13.3–17.7)
POTASSIUM SERPL-SCNC: 4 MMOL/L (ref 3.4–5.3)
SODIUM SERPL-SCNC: 132 MMOL/L (ref 133–144)

## 2019-04-26 PROCEDURE — 80048 BASIC METABOLIC PNL TOTAL CA: CPT | Performed by: PHYSICIAN ASSISTANT

## 2019-04-26 PROCEDURE — 25800030 ZZH RX IP 258 OP 636: Performed by: PHYSICIAN ASSISTANT

## 2019-04-26 PROCEDURE — 97530 THERAPEUTIC ACTIVITIES: CPT | Mod: GP | Performed by: PHYSICAL THERAPIST

## 2019-04-26 PROCEDURE — 00000146 ZZHCL STATISTIC GLUCOSE BY METER IP

## 2019-04-26 PROCEDURE — 85018 HEMOGLOBIN: CPT | Performed by: PHYSICIAN ASSISTANT

## 2019-04-26 PROCEDURE — 25000132 ZZH RX MED GY IP 250 OP 250 PS 637: Performed by: PHYSICIAN ASSISTANT

## 2019-04-26 PROCEDURE — 36415 COLL VENOUS BLD VENIPUNCTURE: CPT | Performed by: PHYSICIAN ASSISTANT

## 2019-04-26 PROCEDURE — 99232 SBSQ HOSP IP/OBS MODERATE 35: CPT | Performed by: STUDENT IN AN ORGANIZED HEALTH CARE EDUCATION/TRAINING PROGRAM

## 2019-04-26 PROCEDURE — 25000131 ZZH RX MED GY IP 250 OP 636 PS 637: Performed by: PHYSICIAN ASSISTANT

## 2019-04-26 PROCEDURE — 25000132 ZZH RX MED GY IP 250 OP 250 PS 637: Performed by: ORTHOPAEDIC SURGERY

## 2019-04-26 PROCEDURE — 25000128 H RX IP 250 OP 636: Performed by: PHYSICIAN ASSISTANT

## 2019-04-26 PROCEDURE — 97161 PT EVAL LOW COMPLEX 20 MIN: CPT | Mod: GP | Performed by: PHYSICAL THERAPIST

## 2019-04-26 PROCEDURE — 40000275 ZZH STATISTIC RCP TIME EA 10 MIN

## 2019-04-26 PROCEDURE — 97116 GAIT TRAINING THERAPY: CPT | Mod: GP | Performed by: PHYSICAL THERAPIST

## 2019-04-26 RX ORDER — TAMSULOSIN HYDROCHLORIDE 0.4 MG/1
0.4 CAPSULE ORAL DAILY
Status: DISCONTINUED | OUTPATIENT
Start: 2019-04-26 | End: 2019-04-26 | Stop reason: HOSPADM

## 2019-04-26 RX ORDER — TAMSULOSIN HYDROCHLORIDE 0.4 MG/1
0.4 CAPSULE ORAL DAILY
Qty: 30 CAPSULE | Refills: 0 | Status: SHIPPED | OUTPATIENT
Start: 2019-04-26

## 2019-04-26 RX ORDER — OXYCODONE HYDROCHLORIDE 5 MG/1
5-10 TABLET ORAL
Qty: 90 TABLET | Refills: 0 | Status: SHIPPED | OUTPATIENT
Start: 2019-04-26

## 2019-04-26 RX ORDER — DIAZEPAM 5 MG
5 TABLET ORAL EVERY 6 HOURS PRN
Qty: 30 TABLET | Refills: 0 | Status: SHIPPED | OUTPATIENT
Start: 2019-04-26

## 2019-04-26 RX ADMIN — Medication 1 LOZENGE: at 01:16

## 2019-04-26 RX ADMIN — TAMSULOSIN HYDROCHLORIDE 0.4 MG: 0.4 CAPSULE ORAL at 17:24

## 2019-04-26 RX ADMIN — OXYCODONE HYDROCHLORIDE 10 MG: 5 TABLET ORAL at 10:47

## 2019-04-26 RX ADMIN — OXYCODONE HYDROCHLORIDE 10 MG: 5 TABLET ORAL at 02:36

## 2019-04-26 RX ADMIN — ALBUTEROL SULFATE 2 PUFF: 90 INHALANT RESPIRATORY (INHALATION) at 13:19

## 2019-04-26 RX ADMIN — SODIUM CHLORIDE: 9 INJECTION, SOLUTION INTRAVENOUS at 00:06

## 2019-04-26 RX ADMIN — ACETAMINOPHEN 975 MG: 325 TABLET, FILM COATED ORAL at 17:24

## 2019-04-26 RX ADMIN — LISINOPRIL AND HYDROCHLOROTHIAZIDE 1 TABLET: 25; 20 TABLET ORAL at 08:49

## 2019-04-26 RX ADMIN — ALBUTEROL SULFATE 2 PUFF: 90 INHALANT RESPIRATORY (INHALATION) at 08:28

## 2019-04-26 RX ADMIN — OXYCODONE HYDROCHLORIDE 10 MG: 5 TABLET ORAL at 05:48

## 2019-04-26 RX ADMIN — AMLODIPINE BESYLATE 5 MG: 5 TABLET ORAL at 08:50

## 2019-04-26 RX ADMIN — ONDANSETRON 4 MG: 4 TABLET, ORALLY DISINTEGRATING ORAL at 14:47

## 2019-04-26 RX ADMIN — SENNOSIDES AND DOCUSATE SODIUM 1 TABLET: 8.6; 5 TABLET ORAL at 08:49

## 2019-04-26 RX ADMIN — CEFAZOLIN SODIUM 1 G: 1 INJECTION, SOLUTION INTRAVENOUS at 04:04

## 2019-04-26 RX ADMIN — ACETAMINOPHEN 975 MG: 325 TABLET, FILM COATED ORAL at 08:49

## 2019-04-26 RX ADMIN — Medication 1 LOZENGE: at 02:37

## 2019-04-26 RX ADMIN — PANTOPRAZOLE SODIUM 40 MG: 40 TABLET, DELAYED RELEASE ORAL at 08:49

## 2019-04-26 RX ADMIN — DIAZEPAM 5 MG: 5 TABLET ORAL at 13:08

## 2019-04-26 RX ADMIN — OXYCODONE HYDROCHLORIDE 10 MG: 5 TABLET ORAL at 17:24

## 2019-04-26 RX ADMIN — MAGNESIUM OXIDE TAB 400 MG (241.3 MG ELEMENTAL MG) 400 MG: 400 (241.3 MG) TAB at 08:49

## 2019-04-26 RX ADMIN — DIAZEPAM 5 MG: 5 TABLET ORAL at 04:03

## 2019-04-26 RX ADMIN — LORATADINE 10 MG: 10 TABLET ORAL at 08:50

## 2019-04-26 ASSESSMENT — ACTIVITIES OF DAILY LIVING (ADL)
ADLS_ACUITY_SCORE: 10

## 2019-04-26 NOTE — PROGRESS NOTES
History:   Minimal complaints.Voiding small amounts with not much found on bladder scan, ambulating, tolerating diet, passing gas, denies pain  B/P: 119/60, T: 96.2, P: 70, R: 14    Intake/Output Summary (Last 24 hours) at 4/26/2019 1553  Last data filed at 4/26/2019 1500  Gross per 24 hour   Intake 3323 ml   Output 875 ml   Net 2448 ml      Results for orders placed or performed during the hospital encounter of 04/25/19 (from the past 24 hour(s))   Basic metabolic panel   Result Value Ref Range    Sodium 132 (L) 133 - 144 mmol/L    Potassium 4.0 3.4 - 5.3 mmol/L    Chloride 101 94 - 109 mmol/L    Carbon Dioxide 26 20 - 32 mmol/L    Anion Gap 5 3 - 14 mmol/L    Glucose 128 (H) 70 - 99 mg/dL    Urea Nitrogen 19 7 - 30 mg/dL    Creatinine 0.99 0.66 - 1.25 mg/dL    GFR Estimate 84 >60 mL/min/[1.73_m2]    GFR Estimate If Black >90 >60 mL/min/[1.73_m2]    Calcium 8.2 (L) 8.5 - 10.1 mg/dL   Hemoglobin   Result Value Ref Range    Hemoglobin 9.4 (L) 13.3 - 17.7 g/dL   Glucose by meter   Result Value Ref Range    Glucose 131 (H) 70 - 99 mg/dL       Dressing:   Dry and intact.   Neuro:   Motor: 5/5   Sensation: intact  Abdomen:  Soft  Extremities:   No calf tenderness  A/P:   Stable POD # 1 L5-S1 ALIF   Start Flomax   Patient can discharge this afternoon

## 2019-04-26 NOTE — PLAN OF CARE
A&O. VSS. CMS intact except for RLE numbness per baseline. Pain managed with prn oxycodone and valium. Dressing is intact with a small amount of dried drainage. BS hypoactive. No flatus yet. Bentley catheter in place, patent.

## 2019-04-26 NOTE — PLAN OF CARE
Discharge Planner PT  PT orders received, eval completed and treatment initiated.  58 yr old admitted with post lami syndrome at L5-S1 with LBP and L5 radiculopathy,  Pt underwent L4-S1 anterior fusion.  Pt to have posterior fusion in 1-2 mo.  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    Pt lives with wife in a 2 level home with 2 steps to enter.  All needs met on main level.  Wife works evenings, but took off of work for 2 weeks to assist pt.  Pt reports using a cane occasionally and furniture walking at baseline.  Patient plan for discharge: Home today  Current status:   Sit <> stand without AD and with FWW with SBA.  Pt impulsive, moving quickly.  Need VCs to slow down and use FWW.  Pt amb 10' in room  without AD and CGA without following therapists commands to wait for therapist to get a walker.  Pt amb 10' in room with his SEC with unsteady gait and wide JESSICA with SBA/CGA.  Pt also amb 250' with FWW and LSO and R AFO with SBA/S.  Pt demonstrates steady gait with symmetrical step lengths.  Cueing to stand tall with light UE support on FWW.   Pt advised to use only  the FWW at  home, not the SEC for increased stability/safety.  Sit <> sup with SBA with pt initially twisting trunk to ly supine prior to lifting LEs onto bed.    Barriers to return to prior living situation: stairs to enter  Recommendations for discharge: Home with assist of wife for ADLs, gait on stairs and donning LSO.  Rationale for recommendations:  Pt progressing with mobility and demonstrates safe gait with FWW.  FWW issued.  Pt to continue with home walking program to progress gait and activity tolerance.  Wife able to provide assist 24/7 as needed for the next 2 weeks.    Physical Therapy Discharge Summary    Reason for therapy discharge:    Discharged to home.    Progress towards therapy goal(s). See goals on Care Plan in [facility] electronic health record for goal details.  Goals partially met.   Barriers to achieving goals:   discharge on same date as initial evaluation.  Pt continues to require SBA with all mobility due to impulsivity and for cueing to maintain spinal precautions.  Pt issued FWW for improved safety and indepenence with gait.  Wife educated in how to assist pt on 2 steps to enter home.    Therapy recommendation(s):    Continue home exercise program.  Pt to continue with home walking program to increase overall strength, activity tolerance and promote spinal fusion and healing.           Entered by: Alaina Woodall 04/26/2019 1:01 PM

## 2019-04-26 NOTE — PROGRESS NOTES
"   04/26/19 1100   Quick Adds   Type of Visit Initial PT Evaluation   Living Environment   Lives With spouse   Living Arrangements house   Home Accessibility stairs to enter home;stairs within home   Number of Stairs, Main Entrance 2   Stair Railings, Main Entrance none   Number of Stairs, Within Home, Primary 10   Transportation Anticipated family or friend will provide   Living Environment Comment Pt lives with wife in a 2 level home with 2 steps to enter.  All needs met on main level.  Wife works evenings, but took off of work for 2 weeks to assist pt.     Self-Care   Usual Activity Tolerance good   Current Activity Tolerance moderate   Regular Exercise Yes   Activity/Exercise Type walking   Exercise Amount/Frequency daily   Equipment Currently Used at Home cane, straight;grab bar, toilet;grab bar, tub/shower;other (see comments)   Activity/Exercise/Self-Care Comment Pt has been amb with a SEC occasionally since surgery last June.  Pt was ambulating on a treadmill ~ 2 miles up until 1 month ago   Functional Level Prior   Ambulation 0-->independent   Transferring 0-->independent   Toileting 0-->independent   Bathing 0-->independent   Communication 0-->understands/communicates without difficulty   Swallowing 0-->swallows foods/liquids without difficulty   Cognition 0 - no cognition issues reported   Fall history within last six months yes   Number of times patient has fallen within last six months 3   Which of the above functional risks had a recent onset or change? 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    General Information   Onset of Illness/Injury or Date of Surgery - Date 04/25/19   Referring Physician Akin Ocampo   Patient/Family Goals Statement \"walk, get up out of the chair\"   Pertinent History of Current Problem (include personal factors and/or comorbidities that impact the POC) 58 yr old admitted with post lami syndrom at L5-S1 " with LBP and L5 radiculopathy,  Pt underwent L4-S1 anterior fusion.  Pt to have posterior fusion in 1-2 mo.  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     Precautions/Limitations spinal precautions   General Observations Pt sitting up in chair with wife present   General Info Comments Pt wears R AFO   Cognitive Status Examination   Orientation orientation to person, place and time   Level of Consciousness alert   Personal Safety and Judgment at risk behaviors demonstrated;impulsive   Memory intact   Pain Assessment   Patient Currently in Pain Yes, see Vital Sign flowsheet  (6/10 abdominal pain at incision)   Integumentary/Edema   Integumentary/Edema Comments abdominal incision   Range of Motion (ROM)   ROM Comment B LEs WFL as observed with functional mobility   Strength   Strength Comments B LEs WFL except for R DF weakness, uses AFO.  Decreased core strength s/p abdominal incision   Bed Mobility   Bed Mobility Comments Sit <> sup with SBA with pt initially twisting trunk to ly supine prior to lifting LEs onto bed.     Transfer Skills   Transfer Comments Sit <> stand without AD and with FWW with SBA.  Pt impulsive, moving quickly.  Need VCs to slow down and use FWW.   Gait   Gait Comments Pt amb 10' in room  without AD with CGA without following therapists commands to use the walker.  Pt amb 10' in room with his SEC with unsteady gait and wide JESSICA with SBA/CGA.  Pt also amb 250' with FWW and LSO and R AFO with SBA/S.  Pt demonstrates steady gait with symmetrical step lengths.  Cueing to stand tall with light UE support on FWW.   Pt advised to use only  the FWW at  home, not the SEC for increased stability/safety.   Balance   Balance Comments Pt able to sit on EOB with S.  Pt requires B UE support on FWW for safety in stance/gait.   Sensory Examination   Sensory Perception Comments R foot permanent  n. damage   Coordination   Coordination no deficits were identified  "  Modality Interventions   Planned Modality Interventions Cryotherapy   General Therapy Interventions   Planned Therapy Interventions bed mobility training;gait training;orthotic fitting/training;transfer training;risk factor education;home program guidelines;progressive activity/exercise   Clinical Impression   Criteria for Skilled Therapeutic Intervention yes, treatment indicated   PT Diagnosis impaired gait   Influenced by the following impairments Decreased safety awareness, impulsivef, impaired gait and balance, decreased activity tolerance, abdominal pain at incision   Functional limitations due to impairments Increased falls risk, unable to amb longer community distances, assist needed for showering/ADLs   Clinical Presentation Stable/Uncomplicated   Clinical Presentation Rationale pt medically stable   Clinical Decision Making (Complexity) Low complexity   Therapy Frequency` 2 times/day   Predicted Duration of Therapy Intervention (days/wks) 2 days   Anticipated Equipment Needs at Discharge front wheeled walker   Anticipated Discharge Disposition Home with Assist   Risk & Benefits of therapy have been explained Yes   Patient, Family & other staff in agreement with plan of care Yes   [facility] TM \"6 Clicks\"   2016, Trustees of [facility], under license to [employer].  All rights reserved.   6 Clicks Short Forms Basic Mobility Inpatient Short Form   [facility]  \"6 Clicks\" V.2 Basic Mobility Inpatient Short Form   1. Turning from your back to your side while in a flat bed without using bedrails? 4 - None   2. Moving from lying on your back to sitting on the side of a flat bed without using bedrails? 3 - A Little   3. Moving to and from a bed to a chair (including a wheelchair)? 4 - None   4. Standing up from a chair using your arms (e.g., wheelchair, or bedside chair)? 4 - None   5. To walk in hospital room? 4 - None   6. Climbing 3-5 steps with a railing? 3 - A Little "   Basic Mobility Raw Score (Score out of 24.Lower scores equate to lower levels of function) 22   Total Evaluation Time   Total Evaluation Time (Minutes) 15

## 2019-04-26 NOTE — PLAN OF CARE
Arrived to floor at 1620 from pacu via cart and transferred to bed via air mat without difficulty. VSS. Room air. Capno monitoring. Dressing to abdomen with small amount of drainage. Has only had water, declines any other clear liquids. Reports R foot numbness that is baseline for pt. Has a right foot drop which he has a brace for when up. Has yet to dangle. Bentley patent. NS @ 100 ml/hr. Taking oxycodone to manage pain as needed. Plans to discharge home tomorrow. Will conitnue to monitor.

## 2019-04-26 NOTE — PLAN OF CARE
A&O x 4. Oxycodone and valium for pain.Up with SBA, back anna, and R foot brace. Drop foot noted, baseline for pt.   Plan to discharge this afternoon.  Pt due to void- voided small amount x 1. working with PT at this time. Will check bladder scan after    Dr. Corina alcantara. Pt voided 25. PVR 35. Tolerating fluids- encouraged pt to increase amount.     Dressing changed in order to bladder scan.

## 2019-04-26 NOTE — PROGRESS NOTES
Patient was fit with a chairback lso.  Wear, care and precautions were given and explained.  Patient is to follow with office as needed.  Kai COLINDRES.

## 2019-04-26 NOTE — PROGRESS NOTES
[facility]    Medicine Progress Note - Hospitalist Service       Date of Admission:  4/25/2019  Date of Service: 04/26/2019    Assessment & Plan      Damaso English is a 58 year old male who was admitted on 4/25/2019. Hospitalist service was asked to see the patient for medical management of hypertension and asthma.    Summary:  Back pain with postlaminectomy syndrome and radiculopathy:    Postoperative care per orthopedic surgery    Pain management and DVT prophylaxis per orthopedic surgery    Hypertension:    Agree with resuming prior to admission antihypertensives, amlodipine and Prinzide    Will follow blood pressures, watch for orthostatic hypotension    Asthma: Mild and controlled    PRN albuterol    Encourage incentive spirometry    Resume Claritin    Hyperlipidemia:    Resume statin, Zocor    History of alcohol abuse    No alcohol since January 2019    GERD:    Continue Protonix    DVT Prophylaxis: Defer to primary service  Code Status: Full Code       Diet: Regular Diet Adult    DVT Prophylaxis: Pneumatic Compression Devices  Bentley Catheter: not present  Code Status: Full Code      Disposition Plan   Expected discharge: per primary service  Entered: Nelson Arriaga MD 04/26/2019, 10:53 AM       The patient's care was discussed with the Bedside Nurse, Patient and Patient's Family.    Nelson Arriaga MD  Hospitalist Service  [facility]    ______________________________________________________________________    Interval History     No acute events overnight  Pain 7/10  No CP/SOB  No fever or chills  Bentley catheter pulled this AM  Hasnt had BM  No nausea/vomiting     Data reviewed today: I reviewed all medications, new labs and imaging results over the last 24 hours. I personally reviewed no images or EKG's today.    Physical Exam   Vital Signs: Temp: 97  F (36.1  C) Temp src: Oral BP: 101/49 Pulse: 73 Heart Rate: 78 Resp: 14 SpO2: 97 % O2 Device: None (Room air) Oxygen Delivery: 2  LPM  Weight: 144 lbs 0 oz    Constitutional: Awake, alert, cooperative, no apparent distress.   Respiratory: Clear to auscultation bilaterally, no crackles or wheezing.  Cardiovascular: Regular rate and rhythm, normal S1 and S2, and no murmur noted.  GI: Soft, non-distended, non-tender, normal bowel sounds.  Musculoskeletal: No joint swelling, erythema or tenderness.  Neurologic: Alert, oriented to person, place and time,  Psychiatric:  no obvious anxiety or depression.    Data   Recent Labs   Lab 04/26/19  0641   HGB 9.4*   *   POTASSIUM 4.0   CHLORIDE 101   CO2 26   BUN 19   CR 0.99   ANIONGAP 5   CHARLI 8.2*   *     Recent Results (from the past 24 hour(s))   XR Surgery SANDRA L/T 5 Min Fluoro w Stills    Narrative    This exam was marked as non-reportable because it will not be read by a   radiologist or a [facility] non-radiologist provider.               Medications     sodium chloride 100 mL/hr at 04/26/19 0006       acetaminophen  975 mg Oral Q8H     amLODIPine  5 mg Oral Daily     lisinopril-hydrochlorothiazide  1 tablet Oral Daily     loratadine  10 mg Oral Daily     magnesium oxide  400 mg Oral Daily     pantoprazole  40 mg Oral QAM AC     senna-docusate  1 tablet Oral BID    Or     senna-docusate  2 tablet Oral BID     simvastatin  20 mg Oral At Bedtime     sodium chloride (PF)  3 mL Intracatheter Q8H

## 2019-04-26 NOTE — PROGRESS NOTES
Pt discharged home with wife @1800. Discharge instructions and medications given to pt and escorted by staff down to car via wheelchair. All questions were answered.

## 2019-05-17 NOTE — DISCHARGE SUMMARY
[facility] Discharge Summary    Damaso English MRN# [id number]   Age: 58 year old YOB: 1960     Date of Admission:  4/25/2019  Date of Discharge::  4/26/2019  6:01 PM  Admitting Physician:  Teodoro Orr MD  Discharge Physician:  Teodoro Orr MD               Admission Diagnoses:   L5-S1 spondylosis          Discharge Diagnosis:   Same          Procedures:   L5-S1ALIF           Medications Prior to Admission:     No medications prior to admission.             Discharge Medications:     Discharge Medication List as of 4/26/2019  5:27 PM      START taking these medications    Details   diazepam (VALIUM) 5 MG tablet Take 1 tablet (5 mg) by mouth every 6 hours as needed for anxiety, Disp-30 tablet, R-0, Local Print      magnesium hydroxide (MILK OF MAGNESIA) 400 MG/5ML suspension Take 30 mLs by mouth daily as needed for constipation, Disp-355 mL, R-0, E-Prescribe      order for DME Equipment being ordered: Walker Wheels () and Walker ()  Treatment Diagnosis: lumbar fusion, unsteady gaitDisp-1 each, R-0, Local Print      oxyCODONE (ROXICODONE) 5 MG tablet Take 1-2 tablets (5-10 mg) by mouth every 3 hours as needed for moderate to severe pain, Disp-90 tablet, R-0, Local Print      tamsulosin (FLOMAX) 0.4 MG capsule Take 1 capsule (0.4 mg) by mouth daily, Disp-30 capsule, R-0, E-Prescribe         CONTINUE these medications which have NOT CHANGED    Details   ACETAMINOPHEN ER PO Take 650 mg by mouth 4 times daily as needed, Historical      albuterol (PROAIR HFA/PROVENTIL HFA/VENTOLIN HFA) 108 (90 Base) MCG/ACT inhaler Inhale 1-2 puffs into the lungs every 4 hours as needed for shortness of breath / dyspnea or wheezing, Historical      AMLODIPINE BESYLATE PO Take 5 mg by mouth daily, Historical      lisinopril-hydrochlorothiazide (PRINZIDE/ZESTORETIC) 20-25 MG tablet Take 1 tablet by mouth daily, Historical      loratadine (CLARITIN) 10 MG tablet Take 10 mg by mouth daily,  Historical      magnesium oxide 400 MG CAPS Take 400 mg by mouth daily, Historical      melatonin 5 MG tablet Take 5 mg by mouth nightly as needed for sleep, Historical      PANTOPRAZOLE SODIUM PO Take 40 mg by mouth every morning (before breakfast), Historical      simvastatin (ZOCOR) 20 MG tablet Take 20 mg by mouth At Bedtime, Historical      tetrahydrozoline-Zn sulfate (VISINE-AC) 0.05-0.25 % ophthalmic solution Apply 1 drop to eye as needed, Historical                   Consultations:   Internal Medicine             Hospital Course:   The patient's hospital course was unremarkable.  He recovered as anticipated and experienced no post-operative complications. He made good progress in eating, voiding and ambulating, He was started on Flowmax on POD #1. Discharged to home on POD #1.           Discharge Instructions and Follow-Up:   He may resume regular diet, will follow up in 4 weeks. He will avoid bending or lifting for 4 weeks. DVT prophylaxis, knee high amber hose for 2 weeks       Discharge Disposition:   Home  Attestation:  I have reviewed today's vital signs, notes, medications, labs and imaging.  Amount of time performed on this discharge summary: 30 minutes.    Teodoro Orr MD

## (undated) DEVICE — GLOVE PROTEXIS BLUE W/NEU-THERA 8.0  2D73EB80

## (undated) DEVICE — ESU ELEC BLADE 6" COATED E1450-6

## (undated) DEVICE — SU MONOCRYL 4-0 PS-2 27" UND Y426H

## (undated) DEVICE — DRAPE OVERHEAD TABLE

## (undated) DEVICE — LINEN FULL SHEET 5511

## (undated) DEVICE — SU PROLENE 5-0 RB-1DA 36"  8556H

## (undated) DEVICE — PREP CHLORAPREP 26ML TINTED ORANGE  260815

## (undated) DEVICE — SPONGE KITTNER 30-101

## (undated) DEVICE — LINEN ORTHO ACL PACK 5447

## (undated) DEVICE — CLIP APPLIER 11" MED LIGACLIP MCM30

## (undated) DEVICE — GLOVE PROTEXIS POWDER FREE 7.5 ORTHOPEDIC 2D73ET75

## (undated) DEVICE — SPONGE LAP 18X18" X8435

## (undated) DEVICE — SPONGE SURGIFOAM 100 1974

## (undated) DEVICE — SU PDS II 0 CTX 60" Z990G

## (undated) DEVICE — SOL NACL 0.9% IRRIG 1000ML BOTTLE 2F7124

## (undated) DEVICE — SUCTION MANIFOLD NEPTUNE 2 SYS 4 PORT 0702-020-000

## (undated) DEVICE — DRAPE C-ARMOR 5 SIDED 5523

## (undated) DEVICE — GLOVE PROTEXIS W/NEU-THERA 7.5  2D73TE75

## (undated) DEVICE — DRAPE C-ARM 60X42" 1013

## (undated) DEVICE — GLOVE PROTEXIS MICRO 7.0  2D73PM70

## (undated) DEVICE — DRAPE STERI TOWEL LG 1010

## (undated) DEVICE — BAG CLEAR TRASH 1.3M 39X33" P4040C

## (undated) DEVICE — PACK LARGE SPINE CUSTOM RIDGES

## (undated) DEVICE — SU SILK 2-0 TIE 24" SA75H

## (undated) DEVICE — CATH TRAY FOLEY COUDE SURESTEP 16FR W/DRN BAG LATEX A304416A

## (undated) DEVICE — LINEN POUCH DBL 5427

## (undated) DEVICE — GOWN IMPERVIOUS SPECIALTY XLG/XLONG 32474

## (undated) DEVICE — ESU GROUND PAD ADULT W/CORD E7507

## (undated) DEVICE — LINEN HALF SHEET 5512

## (undated) DEVICE — CATH IV ANGIO INTRO 12GA 382277

## (undated) DEVICE — NDL SPINAL 18GA 3.5" 405184

## (undated) DEVICE — SU VICRYL 2-0 CT-1 27" UND J259H

## (undated) RX ORDER — LABETALOL 20 MG/4 ML (5 MG/ML) INTRAVENOUS SYRINGE
Status: DISPENSED
Start: 2019-04-25

## (undated) RX ORDER — FENTANYL CITRATE 50 UG/ML
INJECTION, SOLUTION INTRAMUSCULAR; INTRAVENOUS
Status: DISPENSED
Start: 2019-04-25

## (undated) RX ORDER — PHENYLEPHRINE HCL IN 0.9% NACL 1 MG/10 ML
SYRINGE (ML) INTRAVENOUS
Status: DISPENSED
Start: 2019-04-25

## (undated) RX ORDER — LIDOCAINE HYDROCHLORIDE 10 MG/ML
INJECTION, SOLUTION EPIDURAL; INFILTRATION; INTRACAUDAL; PERINEURAL
Status: DISPENSED
Start: 2019-04-25

## (undated) RX ORDER — GLYCOPYRROLATE 0.2 MG/ML
INJECTION INTRAMUSCULAR; INTRAVENOUS
Status: DISPENSED
Start: 2019-04-25

## (undated) RX ORDER — ONDANSETRON 2 MG/ML
INJECTION INTRAMUSCULAR; INTRAVENOUS
Status: DISPENSED
Start: 2019-04-25

## (undated) RX ORDER — HYDROMORPHONE HYDROCHLORIDE 1 MG/ML
INJECTION, SOLUTION INTRAMUSCULAR; INTRAVENOUS; SUBCUTANEOUS
Status: DISPENSED
Start: 2019-04-25

## (undated) RX ORDER — CEFAZOLIN SODIUM 2 G/100ML
INJECTION, SOLUTION INTRAVENOUS
Status: DISPENSED
Start: 2019-04-25

## (undated) RX ORDER — DEXAMETHASONE SODIUM PHOSPHATE 4 MG/ML
INJECTION, SOLUTION INTRA-ARTICULAR; INTRALESIONAL; INTRAMUSCULAR; INTRAVENOUS; SOFT TISSUE
Status: DISPENSED
Start: 2019-04-25

## (undated) RX ORDER — PROPOFOL 10 MG/ML
INJECTION, EMULSION INTRAVENOUS
Status: DISPENSED
Start: 2019-04-25

## (undated) RX ORDER — NEOSTIGMINE METHYLSULFATE 1 MG/ML
VIAL (ML) INJECTION
Status: DISPENSED
Start: 2019-04-25